# Patient Record
Sex: FEMALE | Race: WHITE | NOT HISPANIC OR LATINO | URBAN - METROPOLITAN AREA
[De-identification: names, ages, dates, MRNs, and addresses within clinical notes are randomized per-mention and may not be internally consistent; named-entity substitution may affect disease eponyms.]

---

## 2018-02-24 ENCOUNTER — INPATIENT (INPATIENT)
Facility: HOSPITAL | Age: 68
LOS: 0 days | Discharge: HOME | End: 2018-02-25
Attending: SURGERY

## 2018-02-24 ENCOUNTER — EMERGENCY (EMERGENCY)
Facility: HOSPITAL | Age: 68
LOS: 0 days | Discharge: HOME | End: 2018-02-24

## 2018-02-24 VITALS
SYSTOLIC BLOOD PRESSURE: 120 MMHG | DIASTOLIC BLOOD PRESSURE: 64 MMHG | RESPIRATION RATE: 18 BRPM | HEART RATE: 86 BPM | TEMPERATURE: 101 F | OXYGEN SATURATION: 100 %

## 2018-02-24 DIAGNOSIS — K35.80 UNSPECIFIED ACUTE APPENDICITIS: ICD-10-CM

## 2018-02-24 DIAGNOSIS — R10.31 RIGHT LOWER QUADRANT PAIN: ICD-10-CM

## 2018-02-24 DIAGNOSIS — K57.90 DIVERTICULOSIS OF INTESTINE, PART UNSPECIFIED, WITHOUT PERFORATION OR ABSCESS WITHOUT BLEEDING: ICD-10-CM

## 2018-02-24 DIAGNOSIS — K35.3 ACUTE APPENDICITIS WITH LOCALIZED PERITONITIS: ICD-10-CM

## 2018-02-24 DIAGNOSIS — K21.9 GASTRO-ESOPHAGEAL REFLUX DISEASE WITHOUT ESOPHAGITIS: ICD-10-CM

## 2018-02-24 DIAGNOSIS — Z98.891 HISTORY OF UTERINE SCAR FROM PREVIOUS SURGERY: Chronic | ICD-10-CM

## 2018-02-24 DIAGNOSIS — K57.91 DIVERTICULOSIS OF INTESTINE, PART UNSPECIFIED, WITHOUT PERFORATION OR ABSCESS WITH BLEEDING: ICD-10-CM

## 2018-02-24 LAB
ALBUMIN SERPL ELPH-MCNC: 4.1 G/DL — SIGNIFICANT CHANGE UP (ref 3–5.5)
ALP SERPL-CCNC: 68 U/L — SIGNIFICANT CHANGE UP (ref 30–115)
ALT FLD-CCNC: 19 U/L — SIGNIFICANT CHANGE UP (ref 0–41)
ANION GAP SERPL CALC-SCNC: 8 MMOL/L — SIGNIFICANT CHANGE UP (ref 7–14)
APPEARANCE UR: (no result)
AST SERPL-CCNC: 28 U/L — SIGNIFICANT CHANGE UP (ref 0–41)
BACTERIA # UR AUTO: (no result)
BASOPHILS # BLD AUTO: 0.04 K/UL — SIGNIFICANT CHANGE UP (ref 0–0.2)
BASOPHILS NFR BLD AUTO: 0.2 % — SIGNIFICANT CHANGE UP (ref 0–1)
BILIRUB SERPL-MCNC: 0.7 MG/DL — SIGNIFICANT CHANGE UP (ref 0.2–1.2)
BILIRUB UR-MCNC: NEGATIVE — SIGNIFICANT CHANGE UP
BUN SERPL-MCNC: 16 MG/DL — SIGNIFICANT CHANGE UP (ref 10–20)
CALCIUM SERPL-MCNC: 9.6 MG/DL — SIGNIFICANT CHANGE UP (ref 8.5–10.1)
CHLORIDE SERPL-SCNC: 100 MMOL/L — SIGNIFICANT CHANGE UP (ref 98–110)
CO2 SERPL-SCNC: 27 MMOL/L — SIGNIFICANT CHANGE UP (ref 17–32)
COLOR SPEC: YELLOW — SIGNIFICANT CHANGE UP
CREAT SERPL-MCNC: 0.7 MG/DL — SIGNIFICANT CHANGE UP (ref 0.7–1.5)
DIFF PNL FLD: (no result)
EOSINOPHIL # BLD AUTO: 0 K/UL — SIGNIFICANT CHANGE UP (ref 0–0.7)
EOSINOPHIL NFR BLD AUTO: 0 % — SIGNIFICANT CHANGE UP (ref 0–8)
EPI CELLS # UR: (no result) /HPF
GLUCOSE SERPL-MCNC: 117 MG/DL — HIGH (ref 70–110)
GLUCOSE UR QL: NEGATIVE MG/DL — SIGNIFICANT CHANGE UP
HCT VFR BLD CALC: 37.1 % — SIGNIFICANT CHANGE UP (ref 37–47)
HCT VFR BLD CALC: 40 % — SIGNIFICANT CHANGE UP (ref 37–47)
HGB BLD-MCNC: 12 G/DL — LOW (ref 14–18)
HGB BLD-MCNC: 13.1 G/DL — LOW (ref 14–18)
IMM GRANULOCYTES NFR BLD AUTO: 0.4 % — HIGH (ref 0.1–0.3)
KETONES UR-MCNC: NEGATIVE — SIGNIFICANT CHANGE UP
LACTATE SERPL-SCNC: 1.3 MMOL/L — SIGNIFICANT CHANGE UP (ref 0.5–2.2)
LEUKOCYTE ESTERASE UR-ACNC: (no result)
LIDOCAIN IGE QN: 14 U/L — SIGNIFICANT CHANGE UP (ref 7–60)
LYMPHOCYTES # BLD AUTO: 1.74 K/UL — SIGNIFICANT CHANGE UP (ref 1.2–3.4)
LYMPHOCYTES # BLD AUTO: 8.7 % — LOW (ref 20.5–51.1)
MCHC RBC-ENTMCNC: 27.5 PG — SIGNIFICANT CHANGE UP (ref 27–31)
MCHC RBC-ENTMCNC: 27.7 PG — SIGNIFICANT CHANGE UP (ref 27–31)
MCHC RBC-ENTMCNC: 32.3 G/DL — SIGNIFICANT CHANGE UP (ref 32–37)
MCHC RBC-ENTMCNC: 32.8 G/DL — SIGNIFICANT CHANGE UP (ref 32–37)
MCV RBC AUTO: 84.6 FL — SIGNIFICANT CHANGE UP (ref 81–91)
MCV RBC AUTO: 84.9 FL — SIGNIFICANT CHANGE UP (ref 81–91)
MONOCYTES # BLD AUTO: 1.83 K/UL — HIGH (ref 0.1–0.6)
MONOCYTES NFR BLD AUTO: 9.1 % — SIGNIFICANT CHANGE UP (ref 1.7–9.3)
NEUTROPHILS # BLD AUTO: 16.39 K/UL — HIGH (ref 1.4–6.5)
NEUTROPHILS NFR BLD AUTO: 81.6 % — HIGH (ref 42.2–75.2)
NITRITE UR-MCNC: POSITIVE
NRBC # BLD: 0 /100 WBCS — SIGNIFICANT CHANGE UP (ref 0–0)
NRBC # BLD: 0 /100 WBCS — SIGNIFICANT CHANGE UP (ref 0–0)
PH UR: 5.5 — SIGNIFICANT CHANGE UP (ref 5–8)
PLATELET # BLD AUTO: 241 K/UL — SIGNIFICANT CHANGE UP (ref 130–400)
PLATELET # BLD AUTO: 275 K/UL — SIGNIFICANT CHANGE UP (ref 130–400)
POTASSIUM SERPL-MCNC: 4.3 MMOL/L — SIGNIFICANT CHANGE UP (ref 3.5–5)
POTASSIUM SERPL-SCNC: 4.3 MMOL/L — SIGNIFICANT CHANGE UP (ref 3.5–5)
PROT SERPL-MCNC: 7.1 G/DL — SIGNIFICANT CHANGE UP (ref 6–8)
PROT UR-MCNC: (no result) MG/DL
RBC # BLD: 4.37 M/UL — SIGNIFICANT CHANGE UP (ref 4.2–5.4)
RBC # BLD: 4.73 M/UL — SIGNIFICANT CHANGE UP (ref 4.2–5.4)
RBC # FLD: 14.1 % — SIGNIFICANT CHANGE UP (ref 11.5–14.5)
RBC # FLD: 14.4 % — SIGNIFICANT CHANGE UP (ref 11.5–14.5)
RBC CASTS # UR COMP ASSIST: (no result) /HPF
SODIUM SERPL-SCNC: 135 MMOL/L — SIGNIFICANT CHANGE UP (ref 135–146)
SP GR SPEC: 1.02 — SIGNIFICANT CHANGE UP (ref 1.01–1.03)
UROBILINOGEN FLD QL: 0.2 MG/DL — SIGNIFICANT CHANGE UP (ref 0.2–0.2)
WBC # BLD: 20.09 K/UL — HIGH (ref 4.8–10.8)
WBC # BLD: 21.35 K/UL — HIGH (ref 4.8–10.8)
WBC # FLD AUTO: 20.09 K/UL — HIGH (ref 4.8–10.8)
WBC # FLD AUTO: 21.35 K/UL — HIGH (ref 4.8–10.8)
WBC UR QL: (no result) /HPF

## 2018-02-24 RX ORDER — PANTOPRAZOLE SODIUM 20 MG/1
40 TABLET, DELAYED RELEASE ORAL
Qty: 0 | Refills: 0 | Status: DISCONTINUED | OUTPATIENT
Start: 2018-02-24 | End: 2018-02-25

## 2018-02-24 RX ORDER — CELECOXIB 200 MG/1
200 CAPSULE ORAL ONCE
Qty: 0 | Refills: 0 | Status: DISCONTINUED | OUTPATIENT
Start: 2018-02-24 | End: 2018-02-24

## 2018-02-24 RX ORDER — MORPHINE SULFATE 50 MG/1
4 CAPSULE, EXTENDED RELEASE ORAL
Qty: 0 | Refills: 0 | Status: DISCONTINUED | OUTPATIENT
Start: 2018-02-24 | End: 2018-02-25

## 2018-02-24 RX ORDER — CEFTRIAXONE 500 MG/1
1 INJECTION, POWDER, FOR SOLUTION INTRAMUSCULAR; INTRAVENOUS ONCE
Qty: 0 | Refills: 0 | Status: COMPLETED | OUTPATIENT
Start: 2018-02-24 | End: 2018-02-24

## 2018-02-24 RX ORDER — SODIUM CHLORIDE 9 MG/ML
1000 INJECTION INTRAMUSCULAR; INTRAVENOUS; SUBCUTANEOUS
Qty: 0 | Refills: 0 | Status: DISCONTINUED | OUTPATIENT
Start: 2018-02-24 | End: 2018-02-25

## 2018-02-24 RX ORDER — OXYCODONE HYDROCHLORIDE 5 MG/1
5 TABLET ORAL EVERY 4 HOURS
Qty: 0 | Refills: 0 | Status: DISCONTINUED | OUTPATIENT
Start: 2018-02-24 | End: 2018-02-25

## 2018-02-24 RX ORDER — ESOMEPRAZOLE MAGNESIUM 40 MG/1
1 CAPSULE, DELAYED RELEASE ORAL
Qty: 0 | Refills: 0 | COMMUNITY

## 2018-02-24 RX ORDER — MORPHINE SULFATE 50 MG/1
2 CAPSULE, EXTENDED RELEASE ORAL
Qty: 0 | Refills: 0 | Status: DISCONTINUED | OUTPATIENT
Start: 2018-02-24 | End: 2018-02-25

## 2018-02-24 RX ORDER — CIPROFLOXACIN LACTATE 400MG/40ML
400 VIAL (ML) INTRAVENOUS EVERY 12 HOURS
Qty: 0 | Refills: 0 | Status: DISCONTINUED | OUTPATIENT
Start: 2018-02-24 | End: 2018-02-25

## 2018-02-24 RX ORDER — ACETAMINOPHEN 500 MG
650 TABLET ORAL EVERY 6 HOURS
Qty: 0 | Refills: 0 | Status: DISCONTINUED | OUTPATIENT
Start: 2018-02-24 | End: 2018-02-25

## 2018-02-24 RX ORDER — SODIUM CHLORIDE 9 MG/ML
1000 INJECTION INTRAMUSCULAR; INTRAVENOUS; SUBCUTANEOUS ONCE
Qty: 0 | Refills: 0 | Status: COMPLETED | OUTPATIENT
Start: 2018-02-24 | End: 2018-02-24

## 2018-02-24 RX ORDER — ONDANSETRON 8 MG/1
4 TABLET, FILM COATED ORAL ONCE
Qty: 0 | Refills: 0 | Status: DISCONTINUED | OUTPATIENT
Start: 2018-02-24 | End: 2018-02-25

## 2018-02-24 RX ORDER — MORPHINE SULFATE 50 MG/1
2 CAPSULE, EXTENDED RELEASE ORAL EVERY 4 HOURS
Qty: 0 | Refills: 0 | Status: DISCONTINUED | OUTPATIENT
Start: 2018-02-24 | End: 2018-02-25

## 2018-02-24 RX ORDER — METRONIDAZOLE 500 MG
500 TABLET ORAL EVERY 8 HOURS
Qty: 0 | Refills: 0 | Status: DISCONTINUED | OUTPATIENT
Start: 2018-02-24 | End: 2018-02-25

## 2018-02-24 RX ORDER — SODIUM CHLORIDE 9 MG/ML
1000 INJECTION, SOLUTION INTRAVENOUS
Qty: 0 | Refills: 0 | Status: DISCONTINUED | OUTPATIENT
Start: 2018-02-24 | End: 2018-02-24

## 2018-02-24 RX ORDER — ACETAMINOPHEN 500 MG
975 TABLET ORAL ONCE
Qty: 0 | Refills: 0 | Status: DISCONTINUED | OUTPATIENT
Start: 2018-02-24 | End: 2018-02-24

## 2018-02-24 RX ORDER — HEPARIN SODIUM 5000 [USP'U]/ML
5000 INJECTION INTRAVENOUS; SUBCUTANEOUS EVERY 8 HOURS
Qty: 0 | Refills: 0 | Status: DISCONTINUED | OUTPATIENT
Start: 2018-02-24 | End: 2018-02-25

## 2018-02-24 RX ORDER — METRONIDAZOLE 500 MG
500 TABLET ORAL ONCE
Qty: 0 | Refills: 0 | Status: COMPLETED | OUTPATIENT
Start: 2018-02-24 | End: 2018-02-24

## 2018-02-24 RX ADMIN — CEFTRIAXONE 100 GRAM(S): 500 INJECTION, POWDER, FOR SOLUTION INTRAMUSCULAR; INTRAVENOUS at 12:59

## 2018-02-24 RX ADMIN — SODIUM CHLORIDE 100 MILLILITER(S): 9 INJECTION, SOLUTION INTRAVENOUS at 17:32

## 2018-02-24 RX ADMIN — SODIUM CHLORIDE 100 MILLILITER(S): 9 INJECTION INTRAMUSCULAR; INTRAVENOUS; SUBCUTANEOUS at 21:26

## 2018-02-24 RX ADMIN — SODIUM CHLORIDE 1000 MILLILITER(S): 9 INJECTION INTRAMUSCULAR; INTRAVENOUS; SUBCUTANEOUS at 10:57

## 2018-02-24 RX ADMIN — HEPARIN SODIUM 5000 UNIT(S): 5000 INJECTION INTRAVENOUS; SUBCUTANEOUS at 21:26

## 2018-02-24 RX ADMIN — SODIUM CHLORIDE 2000 MILLILITER(S): 9 INJECTION INTRAMUSCULAR; INTRAVENOUS; SUBCUTANEOUS at 12:59

## 2018-02-24 RX ADMIN — Medication 100 MILLIGRAM(S): at 13:56

## 2018-02-24 RX ADMIN — Medication 100 MILLIGRAM(S): at 23:05

## 2018-02-24 RX ADMIN — Medication 200 MILLIGRAM(S): at 21:42

## 2018-02-24 NOTE — ED PROVIDER NOTE - NS ED ROS FT
Review of Systems    Constitutional: (-) fever/ chills (-) weight loss  Eyes/ENT: (-) blurry vision, (-) epistaxis (-) sore throat (-) ear pain  Cardiovascular: (-) chest pain, (-) syncope (-) palpitations  Respiratory: (-) cough, (-) shortness of breath  Musculoskeletal: (-) neck pain, (-) back pain, (-) joint pain (-) pedal edema   Integumentary: (-) rash, (-) swelling  Neurological: (-) headache, (-) altered mental status  Psychiatric: (-) hallucinations or depression   Allergic/Immunologic: (-) pruritus

## 2018-02-24 NOTE — H&P ADULT - NSHPPHYSICALEXAM_GEN_ALL_CORE
PHYSICAL EXAM:      Constitutional: WD, WN female in NAD.    Eyes: PERRLA, EOM intact.    Neck: supple, no JVD.    Breasts: refused exam.    Back: no CVA tenderness.    Respiratory: CTA B/L, no W/R/R.    Cardiovascular: S1 & S2, RRR.    Gastrointestinal: + BS, soft, no distention, + RLQ tenderness with palpation, no rebound, gaurding or peritoneal signs.    Genitourinary: refused.    Rectal: refused.    Extremities:  No C/C/E, No calf tenderness.      Vascular: + distal pulses.     Neurological: AAxOx3.     Skin: Warm, dry, good color and turgor.      Musculoskeletal: Free painless ROM, adequate strength.

## 2018-02-24 NOTE — H&P ADULT - PROBLEM SELECTOR PLAN 3
-admit to floor under Dr. Smallwood's service.  -IV ABX--Cefotetan.   -serial abd exam  -pain medications  -plan for lap appy today with Dr. Smallwood.  / consent obtained.    - Case d/w Dr. Smallwood.

## 2018-02-24 NOTE — ED PROVIDER NOTE - PHYSICAL EXAMINATION
Vital Signs: I have reviewed the initial vital signs.  Constitutional: well-nourished, no acute distress, normocephalic  Eyes: PERRLA, EOMI, no nystagmus, clear conjunctiva  ENT: MMM, TM b/l clear , no nasal congestion  Cardiovascular: regular rate, regular rhythm, no murmur appreciated  Respiratory: unlabored respiratory effort, clear to auscultation bilaterally  Gastrointestinal: soft, +RLQ tenderness, non-distended  abdomen, no pulsatile mass, no rebound   Musculoskeletal: supple neck, no lower extremity edema, no bony tenderness  Integumentary: warm, dry, no rash  Neurologic: awake, alert, cranial nerves II-XII grossly intact, extremities’ motor and sensory functions grossly intact, no focal deficits, GCS 15  Psychiatric: appropriate mood, appropriate affect

## 2018-02-24 NOTE — H&P ADULT - NSHPLABSRESULTS_GEN_ALL_CORE
13.1   20.09 )-----------( 275      ( 24 Feb 2018 10:52 )             40.0         02-24    135  |  100  |  16  ----------------------------<  117<H>  4.3   |  27  |  0.7    Ca    9.6      24 Feb 2018 10:52    TPro  7.1  /  Alb  4.1  /  TBili  0.7  /  DBili  x   /  AST  28  /  ALT  19  /  AlkPhos  68  02-24          < from: CT Abdomen and Pelvis w/ IV Cont (02.24.18 @ 12:29) >    EXAM:  CT ABDOMEN AND PELVIS IC            PROCEDURE DATE:  02/24/2018            INTERPRETATION:  CLINICAL STATEMENT: Right lower quadrant abdominal pain      TECHNIQUE: Contiguous axial CT images were obtained from the lower chest   to the pubic symphysis following administration of 100cc Optiray 320   intravenous contrast.  Oral contrast was not administered.  Reformatted   images in the coronal and sagittal planes were acquired.    COMPARISON CT: 10/10/2013    OTHER STUDIES USED FOR CORRELATION: None.       FINDINGS:    LOWER CHEST: Unremarkable.    HEPATOBILIARY: Stable left hepatic lobe subcentimeter hypodensity too   small to further characterize.     SPLEEN: Unremarkable.    PANCREAS: Unremarkable.    ADRENAL GLANDS: Unremarkable.    KIDNEYS: Symmetric enhancement without hydronephrosis. Multiple left   parapelvic cysts. Stable right cortical subcentimeter hypodensity too   small to further characterize.      ABDOMINOPELVIC NODES: No abdominal pelvic lymphadenopathy.    PELVIC ORGANS: Unremarkable..    PERITONEUM/MESENTERY/BOWEL: The appendix is dilated up to 0.8 cm and   contains an appendicolith (Se: 2, Im: 51). There are surrounding   periappendiceal inflammatory changes. No evidence of bowel obstruction.   No free intraperitoneal air or ascites.  Diffuse diverticular disease of   the descending and sigmoid colon, without diverticulitis.    BONES/SOFT TISSUES: No acute osseous abnormality. Stable grade 1   anterolisthesis L2-L3, L3-4, L4-5.      IMPRESSION:   Since 10/10/2013:  Dilated appendix up to 0.8 cm with an appendicolith and surrounding   periappendiceal inflammatory changes. These findings are consistent with   acute appendicitis.     < end of copied text >

## 2018-02-24 NOTE — ED PROVIDER NOTE - OBJECTIVE STATEMENT
66 y/o female c/o RLQ abdominal cramping since yesterday. no fever,chills, diarrhea. last BM this am. patient c/o nausea. no chest pain, palpitations, SOB, back pain or dysuria

## 2018-02-24 NOTE — ED PROVIDER NOTE - ATTENDING CONTRIBUTION TO CARE
I personally evaluated the patient. I reviewed the Resident’s or Physician Assistant’s note (as assigned above), and agree with the findings and plan except as documented in my note.  Pt with abd pain, on exam vs appreciated, abd +bs, soft with RLQ/[place] ttp mild guarding no rebound, has appendic itis, for OR

## 2018-02-24 NOTE — H&P ADULT - FAMILY HISTORY
Family history of peripheral vascular disease, father     Family history of hypertension in father, father     Family history of hypertension in mother, mother     Father  Still living? No  Family history of diabetes mellitus in father, Age at diagnosis: Age Unknown  Family history of diverticulitis of colon, Age at diagnosis: Age Unknown     Mother  Still living? No  Family history of diabetes mellitus in father, Age at diagnosis: Age Unknown  Family history of diverticulitis of colon, Age at diagnosis: Age Unknown

## 2018-02-24 NOTE — CHART NOTE - NSCHARTNOTEFT_GEN_A_CORE
Post-op. check -- s/p Laparoscopic Appendectomy      Patient feels well,  reports some mild incisional pain.    Denies N/V, CP, SOB, fevers, chills, tremors.    Patient has not voided yet but denies urinary complaints.          Vital Signs Last 24 Hrs  T(C): 37.6 (24 Feb 2018 17:30), Max: 38.1 (24 Feb 2018 11:57)  T(F): 99.6 (24 Feb 2018 17:30), Max: 100.5 (24 Feb 2018 11:57)  HR: 74 (24 Feb 2018 17:30) (66 - 86)  BP: 120/57 (24 Feb 2018 17:30) (96/53 - 122/88)  BP(mean): --  RR: 19 (24 Feb 2018 17:30) (16 - 20)  SpO2: 95% (24 Feb 2018 17:25) (95% - 100%)        PHYSICAL EXAM:    Constitutional: A&Ox4, Wd, WN    Eyes: PERRLA, EOM intact     ENMT: no sore throat    Neck: no tenderness    Back: no spinal tenderness    Respiratory: CTA B/L    Cardiovascular: S1 & S2, RRR    Gastrointestinal:  +BS, soft, incisions C/D/I, mild incisional tenderness, no distention, no rebound or guarding, no peritoneal signs    Genitourinary: no cva tenderenss    Extremities: normal rom, no edema, no calf tenderness    Vascular: no cyanosis     Neurological: AAxOx3, no focal deficits            Imp:     s/p Lap. Appy. today.      Plan:    - Clears and advance as tolerated to Regular diet.  - IVABX. with Cipro/Flagyl.  - Pain medications with Oxycodone/Morphine PRN.  - CBC in AM.  - OOB / Ambulate.  - Incentive spirometer 10 x q hour.  - DVT prophylaxis with Heparin SQ.  - GI prophylaxis with Protonix.  - Anticipate D/C in AM if remains afebrile.  - Case d/w Dr. Smallwood.

## 2018-02-24 NOTE — BRIEF OPERATIVE NOTE - PROCEDURE
<<-----Click on this checkbox to enter Procedure Appendectomy, laparoscopic  02/24/2018    Active  [id number]

## 2018-02-25 VITALS
HEART RATE: 70 BPM | SYSTOLIC BLOOD PRESSURE: 118 MMHG | DIASTOLIC BLOOD PRESSURE: 58 MMHG | RESPIRATION RATE: 14 BRPM | TEMPERATURE: 99 F

## 2018-02-25 LAB
ANION GAP SERPL CALC-SCNC: 5 MMOL/L — LOW (ref 7–14)
BUN SERPL-MCNC: 14 MG/DL — SIGNIFICANT CHANGE UP (ref 10–20)
CALCIUM SERPL-MCNC: 8.3 MG/DL — LOW (ref 8.5–10.1)
CHLORIDE SERPL-SCNC: 107 MMOL/L — SIGNIFICANT CHANGE UP (ref 98–110)
CO2 SERPL-SCNC: 23 MMOL/L — SIGNIFICANT CHANGE UP (ref 17–32)
CREAT SERPL-MCNC: 0.7 MG/DL — SIGNIFICANT CHANGE UP (ref 0.7–1.5)
GLUCOSE SERPL-MCNC: 128 MG/DL — HIGH (ref 70–110)
HCT VFR BLD CALC: 32.3 % — LOW (ref 37–47)
HGB BLD-MCNC: 10.6 G/DL — LOW (ref 14–18)
MCHC RBC-ENTMCNC: 28 PG — SIGNIFICANT CHANGE UP (ref 27–31)
MCHC RBC-ENTMCNC: 32.8 G/DL — SIGNIFICANT CHANGE UP (ref 32–37)
MCV RBC AUTO: 85.2 FL — SIGNIFICANT CHANGE UP (ref 81–91)
NRBC # BLD: 0 /100 WBCS — SIGNIFICANT CHANGE UP (ref 0–0)
PLATELET # BLD AUTO: 200 K/UL — SIGNIFICANT CHANGE UP (ref 130–400)
POTASSIUM SERPL-MCNC: 4.1 MMOL/L — SIGNIFICANT CHANGE UP (ref 3.5–5)
POTASSIUM SERPL-SCNC: 4.1 MMOL/L — SIGNIFICANT CHANGE UP (ref 3.5–5)
RBC # BLD: 3.79 M/UL — LOW (ref 4.2–5.4)
RBC # FLD: 14.6 % — HIGH (ref 11.5–14.5)
SODIUM SERPL-SCNC: 135 MMOL/L — SIGNIFICANT CHANGE UP (ref 135–146)
WBC # BLD: 16.62 K/UL — HIGH (ref 4.8–10.8)
WBC # FLD AUTO: 16.62 K/UL — HIGH (ref 4.8–10.8)

## 2018-02-25 RX ORDER — MOXIFLOXACIN HYDROCHLORIDE TABLETS, 400 MG 400 MG/1
1 TABLET, FILM COATED ORAL
Qty: 14 | Refills: 0 | OUTPATIENT
Start: 2018-02-25 | End: 2018-03-03

## 2018-02-25 RX ORDER — METRONIDAZOLE 500 MG
1 TABLET ORAL
Qty: 21 | Refills: 0 | OUTPATIENT
Start: 2018-02-25 | End: 2018-03-03

## 2018-02-25 RX ADMIN — HEPARIN SODIUM 5000 UNIT(S): 5000 INJECTION INTRAVENOUS; SUBCUTANEOUS at 14:55

## 2018-02-25 RX ADMIN — Medication 100 MILLIGRAM(S): at 14:54

## 2018-02-25 RX ADMIN — Medication 200 MILLIGRAM(S): at 05:41

## 2018-02-25 RX ADMIN — HEPARIN SODIUM 5000 UNIT(S): 5000 INJECTION INTRAVENOUS; SUBCUTANEOUS at 05:41

## 2018-02-25 RX ADMIN — SODIUM CHLORIDE 100 MILLILITER(S): 9 INJECTION INTRAMUSCULAR; INTRAVENOUS; SUBCUTANEOUS at 11:36

## 2018-02-25 RX ADMIN — PANTOPRAZOLE SODIUM 40 MILLIGRAM(S): 20 TABLET, DELAYED RELEASE ORAL at 09:28

## 2018-02-25 RX ADMIN — Medication 100 MILLIGRAM(S): at 07:00

## 2018-02-25 NOTE — DISCHARGE NOTE ADULT - CARE PROVIDER_API CALL
Silvana Smallwood), Surgery  [address] [place] [address]  Phone: [phone]  Fax: [phone]

## 2018-02-25 NOTE — DISCHARGE NOTE ADULT - MEDICATION SUMMARY - MEDICATIONS TO TAKE
I will START or STAY ON the medications listed below when I get home from the hospital:    NexIUM 40 mg oral delayed release capsule  -- 1 cap(s) by mouth once a day  -- Indication: For Gastroesophageal reflux disease without esophagitis

## 2018-02-25 NOTE — DISCHARGE NOTE ADULT - CARE PLAN
Principal Discharge DX:	Acute appendicitis with localized peritonitis  Goal:	Resolve appendicitis with Lap Appy.  Assessment and plan of treatment:	- s/p Laparoscopic Appendectomy on 2/24/18.  - PO Cipro 500 mg [id number] and PO Flagyl 500 mg q8h x 7 days.  - Follow-up with Dr. Smallwood on 3/6/18.  -  Secondary Diagnosis:	Diverticulosis of large intestine without hemorrhage  Goal:	monitoring for S&S of diverticulosis.  Assessment and plan of treatment:	monitoring for S&S of diverticulosis.  Secondary Diagnosis:	Gastroesophageal reflux disease without esophagitis  Goal:	Continue Nexium  Assessment and plan of treatment:	Continue Nexium

## 2018-02-25 NOTE — DISCHARGE NOTE ADULT - PATIENT PORTAL LINK FT
You can access the [facility], offered by [facility], by registering with the following website: [email or web address]

## 2018-02-25 NOTE — PROGRESS NOTE ADULT - SUBJECTIVE AND OBJECTIVE BOX
LPOD #1--s/p Laparoscopic Appendectomy      Patient seen and examined.   No acute events overnight.  Patient with mild incisional pain but feels well.  Tolerated clears w/o pain, N/V.  No fever, chills or tremors.  Voided freely.  + flatus.      Vital Signs Last 24 Hrs  T(C): 37.1 (25 Feb 2018 06:00), Max: 38.1 (24 Feb 2018 11:57)  T(F): 98.8 (25 Feb 2018 06:00), Max: 100.5 (24 Feb 2018 11:57)  HR: 61 (25 Feb 2018 06:00) (61 - 86)  BP: 95/51 (25 Feb 2018 06:00) (95/51 - 122/88)  BP(mean): --  RR: 16 (25 Feb 2018 06:00) (16 - 20)  SpO2: 94% (24 Feb 2018 22:05) (94% - 100%)        Exam:     General:  Conversant in NAD.  Lungs: CTA B/L.  Cor: S1 & S2, RRR.  Abd:  + BS, soft, dressings C/D/I, no distention, mild incisional tenderness, no rebound, gaurding or peritoneal signs.  Ext: no C/C/E, no calf tenderness.  Neuro: AAxOx3.      Labs:                                     10.6     (20) -->16.62 )-----------( 200      ( 25 Feb 2018 06:39 )                             32.3     02-25    135  |  107  |  14  ----------------------------<  128<H>  4.1   |  23  |  0.7    Ca    8.3<L>      25 Feb 2018 06:39    TPro  7.1  /  Alb  4.1  /  TBili  0.7  /  DBili  x   /  AST  28  /  ALT  19  /  AlkPhos  68  02-24

## 2018-02-25 NOTE — DISCHARGE NOTE ADULT - INSTRUCTIONS
Regular diet May shower with dressings in place and if fall off prior to appointment, place bandaid on incisions.

## 2018-02-25 NOTE — PROGRESS NOTE ADULT - PROBLEM SELECTOR PLAN 3
-s/p Lap. Appy. on 2/24/18.  -pain medications PRN.  -GI/VTE prophylaxis.  - Case d/w Dr. Smallwood and states OK to discharge home today with Rx. of P.O. Cipro and Flagyl x 7days, Rx. of Percocet PRN pain and instruct to follow up in his office on March 6, 2018. (call to confirm appt.).   -D/W patient and  and verbalized understanding.    -D/C home today.

## 2018-02-25 NOTE — DISCHARGE NOTE ADULT - HOSPITAL COURSE
Admitted on 2/24/18 for Acute Appendicitis to Dr. Smallwood's service from E.R.   NPO/ IV hydration / pain mgt. / Ivabx. with Cefotetan / GI and DVT prophylaxis.  s/p Laparoscopic Appendectomy 2/24/18.  Remained afebrile.  Repeat labs with decrease of WBC from 20 -- 16.  Tolerated diet   Discharged home in stable condition on 2/25/18 with follow up on Tuesday 3/6/18.

## 2018-02-25 NOTE — DISCHARGE NOTE ADULT - SECONDARY DIAGNOSIS.
Diverticulosis of large intestine without hemorrhage Gastroesophageal reflux disease without esophagitis

## 2018-02-25 NOTE — DISCHARGE NOTE ADULT - NS AS ACTIVITY OBS
Showering allowed/No Heavy lifting/straining/Walking-Indoors allowed/Return to Work/School allowed/Walking-Outdoors allowed/Stairs allowed

## 2018-02-27 LAB — SURGICAL PATHOLOGY STUDY: SIGNIFICANT CHANGE UP

## 2018-04-27 PROBLEM — K57.90 DIVERTICULOSIS OF INTESTINE, PART UNSPECIFIED, WITHOUT PERFORATION OR ABSCESS WITHOUT BLEEDING: Chronic | Status: ACTIVE | Noted: 2018-02-24

## 2018-04-27 PROBLEM — K21.9 GASTRO-ESOPHAGEAL REFLUX DISEASE WITHOUT ESOPHAGITIS: Chronic | Status: ACTIVE | Noted: 2018-02-24

## 2020-07-21 NOTE — DISCHARGE NOTE ADULT - PLAN OF CARE
Likely cleared by now.  Urinalysis done tonight unimpressive, and unlikely to be related to acute presentation.  Will hold off on further antibiotics for now.     Resolve appendicitis with Lap Appy. - s/p Laparoscopic Appendectomy on 2/24/18.  - PO Cipro 500 mg [id number] and PO Flagyl 500 mg q8h x 7 days.  - Follow-up with Dr. Smallwood on 3/6/18.  - monitoring for S&S of diverticulosis. Continue Nexium